# Patient Record
(demographics unavailable — no encounter records)

---

## 2024-11-18 NOTE — HISTORY OF PRESENT ILLNESS
[FreeTextEntry1] : positive pregnancy  [de-identified] : 26 yo female with hx of anxiety, migraines, presenting for positive pregnancy. She had went to the ED on  for cramps and chills and was found to have a positive pregnancy but TVUS showed pregnancy of indeterminate location. Possible early IUP. Desired pregnancy,  as her and her partner has been trying. Of note she had an ectopic pregnancy in  and had to remove her left fallopian tube.   ; 2 year old and 4 year old.   LMP: 10/10/2024

## 2024-11-18 NOTE — INTERPRETER SERVICES
[Interpreters_IDNumber] : [id number] [Interpreters_FullName] : Jose Antonio Vigil [TWNoteComboBox1] : [place]

## 2024-11-18 NOTE — INTERPRETER SERVICES
[Interpreters_IDNumber] : [id number] [Interpreters_FullName] : Jose Antonio Vigil [TWNoteComboBox1] : Bulgarian

## 2024-11-18 NOTE — HISTORY OF PRESENT ILLNESS
[FreeTextEntry1] : positive pregnancy  [de-identified] : 26 yo female with hx of anxiety, migraines, presenting for positive pregnancy. She had went to the ED on  for cramps and chills and was found to have a positive pregnancy but TVUS showed pregnancy of indeterminate location. Possible early IUP. Desired pregnancy,  as her and her partner has been trying. Of note she had an ectopic pregnancy in  and had to remove her left fallopian tube.   ; 2 year old and 4 year old.   LMP: 10/10/2024

## 2024-11-18 NOTE — INTERPRETER SERVICES
[Interpreters_IDNumber] : [id number] [Interpreters_FullName] : Jose Antonio Vigil [TWNoteComboBox1] : [place]

## 2024-11-18 NOTE — HISTORY OF PRESENT ILLNESS
[FreeTextEntry1] : positive pregnancy  [de-identified] : 28 yo female with hx of anxiety, migraines, presenting for positive pregnancy. She had went to the ED on  for cramps and chills and was found to have a positive pregnancy but TVUS showed pregnancy of indeterminate location. Possible early IUP. Desired pregnancy,  as her and her partner has been trying. Of note she had an ectopic pregnancy in  and had to remove her left fallopian tube.   ; 2 year old and 4 year old.   LMP: 10/10/2024

## 2025-06-27 NOTE — HISTORY OF PRESENT ILLNESS
[FreeTextEntry1] : 6/26/2025  Mary Sanderson is a 26yo woman with PMH of depression, currently in 3rd trimester of pregnancy (8 months, RADHA 7/29/25) who presents for initial evaluation of headaches.   Patient reports having occasional headaches prior to pregnancy, however for the past month, she has near daily headaches. Patient states headache is the same quality, now more frequent and severe.  Her headache is located bitemporally. Describes headache as pulsating, associated with sensitivity to light and sound and nausea. Her headache is currently an 8/10. She wakes up with the headache and says it gets worse throughout the day. Standing up makes the headache worse. It is usually improved with tylenol, headache goes away for a few hours but then returns. Resting in the dark also improves her headache. She denies worsening headaches with her first two pregnancies. She wears glasses but thinks her vision is more blurred. Saw eye doctor recently and was told to revisit after delivery, 10/24 was her last optometry visit. Endorses chronic floaters.   She has never been worked up for headaches in the past. Not currently taking any meds besides Tylenol PRN. No hx of OCP use, denies hx of blood clots. Family hx pertinent for her mother who has headaches.  Endorses good PO intake and hydration but she reports poor sleep and increased fatigue. Denies smoking or alcohol use, also denies head trauma and neck pain.

## 2025-06-27 NOTE — DISCUSSION/SUMMARY
[FreeTextEntry1] : Impression: Likely migraine headaches, worsened in setting of pregnancy. Suspect headaches will improve post-partum   Plan: - Start supplements for migraine prevention - magnesium, riboflavin and CoQ10. - Patient can take Tylenol as needed and counseled to avoid NSAIDs during pregnancy - Counseled regarding sleep hygiene and oral hydration  - Advised to seen care in ED if headache becomes sudden onset and associated with any neurologic symptoms  - If headaches do not improve, can consider MR Brain/ MR Angio/ MR Venogram  - Return to clinic after delivery if headaches do not improve or sooner as needed  Patient seen and discussed with Neurology attending Dr. Watson.

## 2025-06-27 NOTE — END OF VISIT
[] : Resident [FreeTextEntry3] : A 36 weeks pregnant woman here for headache. No focal neurological changes. stiffneck negative. likely migraine headache. avoid NSAIDs, tylenol prn magnesium, riboflavin for headache. patient is unable to lie flat for MRI. advise of symptom gets worse, to go to ED. [Time Spent: ___ minutes] : I have spent [unfilled] minutes of time on the encounter which excludes teaching and separately reported services.

## 2025-06-27 NOTE — PHYSICAL EXAM
[General Appearance - Alert] : alert [Oriented To Time, Place, And Person] : oriented to person, place, and time [Cranial Nerves Optic (II)] : visual acuity intact bilaterally,  visual fields full to confrontation, pupils equal round and reactive to light [Cranial Nerves Oculomotor (III)] : extraocular motion intact [Cranial Nerves Trigeminal (V)] : facial sensation intact symmetrically [Cranial Nerves Facial (VII)] : face symmetrical [Cranial Nerves Vestibulocochlear (VIII)] : hearing was intact bilaterally [Cranial Nerves Glossopharyngeal (IX)] : tongue and palate midline [Cranial Nerves Accessory (XI - Cranial And Spinal)] : head turning and shoulder shrug symmetric [Cranial Nerves Hypoglossal (XII)] : there was no tongue deviation with protrusion [Motor Tone] : muscle tone was normal in all four extremities [Motor Strength] : muscle strength was normal in all four extremities [Sensation Tactile Decrease] : light touch was intact [Abnormal Walk] : normal gait [Balance] : balance was intact [2+] : Ankle jerk left 2+ [PERRL With Normal Accommodation] : pupils were equal in size, round, reactive to light, with normal accommodation [Paresis Pronator Drift Right-Sided] : no pronator drift on the right [Paresis Pronator Drift Left-Sided] : no pronator drift on the left [Past-pointing] : there was no past-pointing [Tremor] : no tremor present [Coordination - Dysmetria Impaired Finger-to-Nose Bilateral] : not present [FreeTextEntry6] : Grossly 5/5 limited by effort  [FreeTextEntry1] : Fundoscopy attempted but poor visualization

## 2025-07-25 NOTE — HISTORY OF PRESENT ILLNESS
[Postpartum Follow Up] : postpartum follow up [Delivery Date: ___] : on [unfilled] [] : delivered by vaginal delivery [Male] : Delivery History: baby boy [Wt. ___] : weighing [unfilled] [Pertussis Vaccine] : Pertussis vaccine administered [Forceps Assisted Vaginal Delivery] : forceps assisted vaginal delivery [Delivery Date Was ___] : delivery date was [unfilled] [Rhogam] : Rhogam was not administered [Rubella Vaccine] : Rubella vaccine was not administered [BTL] : no tubal ligation [Boy] : baby is a boy [Infant's Name ___] : [unfilled] [___ Lbs] : [unfilled] lbs [___ Oz] : [unfilled] oz [___ at One Minute] : [unfilled] at one minute after birth [___ at Five Minutes] : [unfilled] at five minutes after birth [Living at Home] : is currently living at home [Bottle Feeding] : bottle feeding [Breastfeeding] : currently nursing [BF with Difficulty] : nursing without difficulty [Resumed Menses] : has not resumed her menses [Resumed [place]] : has not resumed intercourse [Currently Experiencing] : The patient is currently experiencing symptoms. [Episiotomy Site Pain] : episiotomy site pain [None] : No associated symptoms are reported [Hematoma] : vaginal hematoma [Abscess Formation] : no vaginal abscess [Healing Well] : is healing well [Infected] : did not appear infected [Dehiscence] : was not dehisced [Doing Well] : is doing well [No Sign of Infection] : is showing no signs of infection [Fair Pain Control] : has fair pain control [No [place]] : to avoid sexual intercourse [No Tampons/Suppositories] : against using tampons or vaginal suppositories [Limited ADLs] : to participate in activities of daily living with limitations [No Work] : not to work [No Housework] : not to do housework [No Sports] : not to participate in sports [FreeTextEntry8] : s/p delivery on 7/15/25, ER visit on 7/21 for labial hematoma, follow up ER visit today [de-identified] : right labial hematoma - healing [de-identified] : s/p FAVD with formation of labial hematoma [de-identified] : continue antibioitcs as prescribed - pt given bactrim, rtc in 2 wks for follow up, pain medication to continue, ice packs to perineum and dibucaine/epifoam sent to pharmacy